# Patient Record
Sex: MALE | Race: WHITE | NOT HISPANIC OR LATINO | URBAN - METROPOLITAN AREA
[De-identification: names, ages, dates, MRNs, and addresses within clinical notes are randomized per-mention and may not be internally consistent; named-entity substitution may affect disease eponyms.]

---

## 2019-03-23 ENCOUNTER — TRANSCRIBE ORDERS (OUTPATIENT)
Dept: ADMINISTRATIVE | Age: 64
End: 2019-03-23

## 2019-03-23 ENCOUNTER — APPOINTMENT (OUTPATIENT)
Dept: RADIOLOGY | Age: 64
End: 2019-03-23
Payer: COMMERCIAL

## 2019-03-23 DIAGNOSIS — M25.562 LEFT KNEE PAIN, UNSPECIFIED CHRONICITY: ICD-10-CM

## 2019-03-23 DIAGNOSIS — M25.562 LEFT KNEE PAIN, UNSPECIFIED CHRONICITY: Primary | ICD-10-CM

## 2019-03-23 PROCEDURE — 73562 X-RAY EXAM OF KNEE 3: CPT

## 2019-09-18 ENCOUNTER — TRANSCRIBE ORDERS (OUTPATIENT)
Dept: ADMINISTRATIVE | Facility: HOSPITAL | Age: 64
End: 2019-09-18

## 2019-09-18 DIAGNOSIS — G89.29 CHRONIC PAIN OF LEFT KNEE: Primary | ICD-10-CM

## 2019-09-18 DIAGNOSIS — M25.562 CHRONIC PAIN OF LEFT KNEE: Primary | ICD-10-CM

## 2019-09-29 ENCOUNTER — HOSPITAL ENCOUNTER (OUTPATIENT)
Dept: RADIOLOGY | Facility: HOSPITAL | Age: 64
Discharge: HOME | End: 2019-09-29
Attending: ANESTHESIOLOGY
Payer: COMMERCIAL

## 2019-09-29 DIAGNOSIS — M25.562 CHRONIC PAIN OF LEFT KNEE: ICD-10-CM

## 2019-09-29 DIAGNOSIS — G89.29 CHRONIC PAIN OF LEFT KNEE: ICD-10-CM

## 2019-09-29 PROCEDURE — 73721 MRI JNT OF LWR EXTRE W/O DYE: CPT

## 2020-01-11 ENCOUNTER — OFFICE VISIT (OUTPATIENT)
Dept: URGENT CARE | Facility: CLINIC | Age: 65
End: 2020-01-11
Payer: COMMERCIAL

## 2020-01-11 VITALS
HEART RATE: 94 BPM | SYSTOLIC BLOOD PRESSURE: 118 MMHG | WEIGHT: 208 LBS | HEIGHT: 71 IN | RESPIRATION RATE: 18 BRPM | DIASTOLIC BLOOD PRESSURE: 80 MMHG | BODY MASS INDEX: 29.12 KG/M2 | OXYGEN SATURATION: 100 % | TEMPERATURE: 98.8 F

## 2020-01-11 DIAGNOSIS — I49.9 IRREGULAR HEART RATE: Primary | ICD-10-CM

## 2020-01-11 PROCEDURE — 93005 ELECTROCARDIOGRAM TRACING: CPT | Performed by: PREVENTIVE MEDICINE

## 2020-01-11 PROCEDURE — 99213 OFFICE O/P EST LOW 20 MIN: CPT | Performed by: PREVENTIVE MEDICINE

## 2020-01-11 NOTE — PATIENT INSTRUCTIONS
You need to see a physician as soon as possible and to evaluate your atrial fibrillation    Ideally see a cardiologist

## 2020-01-12 LAB
ATRIAL RATE: 178 BPM
QRS AXIS: 7 DEGREES
QRSD INTERVAL: 94 MS
QT INTERVAL: 332 MS
QTC INTERVAL: 399 MS
T WAVE AXIS: 5 DEGREES
VENTRICULAR RATE: 87 BPM

## 2020-01-12 PROCEDURE — 93010 ELECTROCARDIOGRAM REPORT: CPT | Performed by: PREVENTIVE MEDICINE

## 2023-04-09 NOTE — PROGRESS NOTES
[facility]        NAME: Job Joy is a 71 y o  male  : 1955    MRN: [id number]  DATE: 2020  TIME: 12:17 PM    Assessment and Plan   Irregular heart rate [I49 9]  1  Irregular heart rate  POCT ECG         Patient Instructions       Follow up with PCP in 3-5 days  Proceed to  ER if symptoms worsen  Chief Complaint     Chief Complaint   Patient presents with    Abnormal ECG     Patient seen as Urgent Care due to irregular HR discovered this am during a DOT Physical performed at this location (Employer unwilling to pay for follow-through EKG, therefore pt made determination to be seen urgently ) Denies any SOB, lightheadedness, or "palpitations " Had experienced "heart fluttering" ~ 2 years ago and f/u'd with PCP - was advised EKG was normal at that time  History of Present Illness       He is here for routine department transfer addition physical   I noticed an irregular heartbeat  Cardiogram reveals atrial fibrillation  Note he said 2 years ago his family doctor noted lot of extra beats to his heart  Review of Systems   Review of Systems   Respiratory: Negative for shortness of breath  Cardiovascular: Positive for palpitations  Negative for chest pain  Current Medications     No current outpatient medications on file  Current Allergies     Allergies as of 2020 - Reviewed 2020   Allergen Reaction Noted    Sulfites  2020            The following portions of the patient's history were reviewed and updated as appropriate: allergies, current medications, past family history, past medical history, past social history, past surgical history and problem list      No past medical history on file  No past surgical history on file  No family history on file  Medications have been verified          Objective   /80   Pulse 94   Temp 98 8 °F (37 1 °C)   Resp 18   Ht 5' 11" (1 803 m)   Wt 94 3 kg (208 lb)   SpO2 100%   BMI 29 01 kg/m²        Physical Exam     Physical Exam   Cardiovascular: Normal heart sounds  Exam reveals no gallop and no friction rub  No murmur heard    Heart rhythm is irregularly irregular     EKG reveals atrial fibrillating no

## 2024-03-04 ENCOUNTER — APPOINTMENT (OUTPATIENT)
Dept: URGENT CARE | Facility: CLINIC | Age: 69
End: 2024-03-04

## 2024-09-03 ENCOUNTER — OFFICE VISIT (OUTPATIENT)
Dept: INTERNAL MEDICINE CLINIC | Facility: CLINIC | Age: 69
End: 2024-09-03

## 2024-09-03 ENCOUNTER — TELEPHONE (OUTPATIENT)
Dept: INTERNAL MEDICINE CLINIC | Facility: CLINIC | Age: 69
End: 2024-09-03

## 2024-09-03 VITALS
HEART RATE: 85 BPM | WEIGHT: 177.13 LBS | SYSTOLIC BLOOD PRESSURE: 132 MMHG | HEIGHT: 70 IN | OXYGEN SATURATION: 99 % | BODY MASS INDEX: 25.36 KG/M2 | DIASTOLIC BLOOD PRESSURE: 81 MMHG | TEMPERATURE: 97.6 F

## 2024-09-03 DIAGNOSIS — Z12.5 SCREENING PSA (PROSTATE SPECIFIC ANTIGEN): ICD-10-CM

## 2024-09-03 DIAGNOSIS — Z12.11 COLON CANCER SCREENING: ICD-10-CM

## 2024-09-03 DIAGNOSIS — Z00.00 WELL ADULT EXAM: Primary | ICD-10-CM

## 2024-09-03 DIAGNOSIS — Z87.19 HISTORY OF PANCREATITIS: ICD-10-CM

## 2024-09-03 DIAGNOSIS — Z72.0 TOBACCO USE: ICD-10-CM

## 2024-09-03 DIAGNOSIS — E78.49 OTHER HYPERLIPIDEMIA: ICD-10-CM

## 2024-09-03 DIAGNOSIS — I48.20 ATRIAL FIBRILLATION, CHRONIC (HCC): ICD-10-CM

## 2024-09-03 DIAGNOSIS — E55.9 VITAMIN D DEFICIENCY: ICD-10-CM

## 2024-09-03 DIAGNOSIS — R73.01 ELEVATED FASTING GLUCOSE: ICD-10-CM

## 2024-09-03 PROCEDURE — 1159F MED LIST DOCD IN RCRD: CPT | Performed by: FAMILY MEDICINE

## 2024-09-03 PROCEDURE — 1160F RVW MEDS BY RX/DR IN RCRD: CPT | Performed by: FAMILY MEDICINE

## 2024-09-03 PROCEDURE — 99203 OFFICE O/P NEW LOW 30 MIN: CPT | Performed by: FAMILY MEDICINE

## 2024-09-03 PROCEDURE — 3725F SCREEN DEPRESSION PERFORMED: CPT | Performed by: FAMILY MEDICINE

## 2024-09-03 PROCEDURE — 1101F PT FALLS ASSESS-DOCD LE1/YR: CPT | Performed by: FAMILY MEDICINE

## 2024-09-03 PROCEDURE — 3288F FALL RISK ASSESSMENT DOCD: CPT | Performed by: FAMILY MEDICINE

## 2024-09-03 PROCEDURE — 99387 INIT PM E/M NEW PAT 65+ YRS: CPT | Performed by: FAMILY MEDICINE

## 2024-09-03 NOTE — ASSESSMENT & PLAN NOTE
Due for fasting labs , and screening PSA , declines pneumovax and Shingrix , agreeable to Cologuard

## 2024-09-03 NOTE — PROGRESS NOTES
Adult Annual Physical  Name: Chucho Mckeon      : 1955      MRN: [id number]  Encounter Provider: Jill Parker MD  Encounter Date: 9/3/2024   Encounter department: [facility]    Assessment & Plan   1. Well adult exam  Assessment & Plan:  Due for fasting labs , and screening PSA , declines pneumovax and Shingrix , agreeable to Cologuard   2. Vitamin D deficiency  3. 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 History of pancreatitis  Assessment & Plan:  Pt had admission earlier this year  severe upper abdominal pain  dx  pancreatitis with hemorrhage necessitating exploratory lap , had IR embolization of pancreatic branch pancreatic artery , he had a lengthy hospital stay and rehabilitation took 2 months to feel better . He embarked upon healthy diet , and resuming regular exercise slowly , he has lost 20 + lbs , feels very well   He is still drinking alcohol 6 pack beer daily , he wants to continue  cutting back and quit he is encouraged to do this   5. Tobacco use  -     CBC and differential; Future  6. Colon cancer screening  -     Cologuard  7. Screening PSA (prostate specific antigen)  -     PSA, Total Screen; Future  8. Elevated fasting glucose  -     Comprehensive metabolic panel; Future  -     Hemoglobin A1C; Future  9. Other hyperlipidemia  -     Lipid panel; Future    Immunizations and preventive care screenings were discussed with patient today. Appropriate education was printed on patient's after visit summary.        Counseling:  Alcohol/drug use: discussed moderation in alcohol intake, the recommendations for healthy alcohol use, and avoidance of illicit drug use.  Dental Health: discussed importance of  regular tooth brushing, flossing, and dental visits.  Exercise: the importance of regular exercise/physical activity was discussed. Recommend exercise 3-5 times per week for at least 30 minutes.     BMI Counseling: Body mass index is 25.41 kg/m². The BMI is above normal. Nutrition recommendations include decreasing portion sizes, encouraging healthy choices of fruits and vegetables, decreasing fast food intake, consuming healthier snacks, limiting drinks that contain sugar and reducing intake of saturated and trans fat. Exercise recommendations include exercising 3-5 times per week. Rationale for BMI follow-up plan is due to patient being overweight or obese.         History of Present Illness     Adult Annual Physical New pt to this practice , prior pt of mine , annual wellness visit   Vision a bit over 1 year   Dental q 6 mo   Hearing normal   Diet good with meat chicken fish , could do better with veggies fruits does have salad 3 x per week water  3 qt per day smoothie and coffee , green tea 16 qt per day , - juice - soda    Exercise universal gym 3 - 5  x per week 30 minutes    Smoker former smoked 30 yrs , 1 ppd quit cold turkey ,   ETOH he has cut back   Fam hx - HTN , - HLD - DM   Fam hx cancer Sister melanoma did age 40   Review of Systems   Constitutional:  Negative for chills and fever.   HENT:  Negative for ear pain and sore throat.    Eyes:  Negative for pain and visual disturbance.   Respiratory:  Negative for cough and shortness of breath.    Cardiovascular:  Negative for chest pain and palpitations.   Gastrointestinal:  Negative for abdominal pain and vomiting.   Genitourinary:  Negative for dysuria and hematuria.   Musculoskeletal:  Negative for arthralgias and back pain.   Skin:  Negative for color change and rash.   Neurological:  Negative for seizures and syncope.   All other systems reviewed and are negative.        Objective     /81 (BP Location: Right arm, Patient Position: Sitting, Cuff  "Size: Standard)   Pulse 85   Temp 97.6 °F (36.4 °C) (Temporal)   Ht 5' 10\" (1.778 m)   Wt 80.3 kg (177 lb 2 oz)   SpO2 99%   BMI 25.41 kg/m²     Physical Exam  Vitals and nursing note reviewed.   Constitutional:       General: He is not in acute distress.     Appearance: He is well-developed.      Comments: Skin with good color turgor , well hydrated ,no distress noted     HENT:      Head: Normocephalic and atraumatic.      Right Ear: No decreased hearing noted. No middle ear effusion. There is no impacted cerumen.      Left Ear: No decreased hearing noted.  No middle ear effusion. There is no impacted cerumen.      Nose: No congestion.      Mouth/Throat:      Pharynx: Oropharynx is clear.   Eyes:      Conjunctiva/sclera: Conjunctivae normal.   Neck:      Thyroid: No thyromegaly.   Cardiovascular:      Rate and Rhythm: Normal rate and regular rhythm.      Heart sounds: Normal heart sounds. No murmur heard.  Pulmonary:      Effort: Pulmonary effort is normal. No respiratory distress.      Breath sounds: Normal breath sounds.   Abdominal:      Palpations: Abdomen is soft.      Tenderness: There is no abdominal tenderness. There is no right CVA tenderness, left CVA tenderness, guarding or rebound.   Musculoskeletal:         General: No swelling.      Cervical back: Neck supple.   Lymphadenopathy:      Cervical: No cervical adenopathy.      Right cervical: No superficial cervical adenopathy.     Left cervical: No superficial cervical adenopathy.   Skin:     General: Skin is warm and dry.      Capillary Refill: Capillary refill takes less than 2 seconds.   Neurological:      Mental Status: He is alert.      Comments: Non focal exam   Psychiatric:         Attention and Perception: Attention normal.         Mood and Affect: Mood normal.         Speech: Speech normal.         Behavior: Behavior normal.         Thought Content: Thought content normal.         "

## 2024-09-03 NOTE — ASSESSMENT & PLAN NOTE
Pt had admission earlier this year  severe upper abdominal pain  dx  pancreatitis with hemorrhage necessitating exploratory lap , had IR embolization of pancreatic branch pancreatic artery , he had a lengthy hospital stay and rehabilitation took 2 months to feel better . He embarked upon healthy diet , and resuming regular exercise slowly , he has lost 20 + lbs , feels very well   He is still drinking alcohol 6 pack beer daily , he wants to continue  cutting back and quit he is encouraged to do this

## 2024-09-03 NOTE — ASSESSMENT & PLAN NOTE
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

## 2024-09-05 ENCOUNTER — TELEPHONE (OUTPATIENT)
Dept: INTERNAL MEDICINE CLINIC | Facility: CLINIC | Age: 69
End: 2024-09-05

## 2024-09-05 NOTE — TELEPHONE ENCOUNTER
Received call from patient. Introduced self and role. Patient updated labs were ordered by physician. Patient does not need paper scripts if [facility]. Patient updated if going outside the network he will need copies of his ordered labs. Patient expressed understanding and stated he will just go to a [facility] facility.     Patient aware physician ordered a lipid panel and to fast 10-12 hrs prior to getting bloodwork completed.

## 2024-09-08 ENCOUNTER — APPOINTMENT (OUTPATIENT)
Dept: LAB | Age: 69
End: 2024-09-08
Payer: COMMERCIAL

## 2024-09-08 DIAGNOSIS — Z72.0 TOBACCO USE: ICD-10-CM

## 2024-09-08 DIAGNOSIS — E78.49 OTHER HYPERLIPIDEMIA: ICD-10-CM

## 2024-09-08 DIAGNOSIS — R73.01 ELEVATED FASTING GLUCOSE: ICD-10-CM

## 2024-09-08 DIAGNOSIS — Z12.5 SCREENING PSA (PROSTATE SPECIFIC ANTIGEN): ICD-10-CM

## 2024-09-08 LAB
ALBUMIN SERPL BCG-MCNC: 4.1 G/DL (ref 3.5–5)
ALP SERPL-CCNC: 78 U/L (ref 34–104)
ALT SERPL W P-5'-P-CCNC: 35 U/L (ref 7–52)
ANION GAP SERPL CALCULATED.3IONS-SCNC: 9 MMOL/L (ref 4–13)
AST SERPL W P-5'-P-CCNC: 36 U/L (ref 13–39)
BASOPHILS # BLD AUTO: 0.03 THOUSANDS/ÂΜL (ref 0–0.1)
BASOPHILS NFR BLD AUTO: 0 % (ref 0–1)
BILIRUB SERPL-MCNC: 1.23 MG/DL (ref 0.2–1)
BUN SERPL-MCNC: 20 MG/DL (ref 5–25)
CALCIUM SERPL-MCNC: 9.3 MG/DL (ref 8.4–10.2)
CHLORIDE SERPL-SCNC: 101 MMOL/L (ref 96–108)
CHOLEST SERPL-MCNC: 171 MG/DL
CO2 SERPL-SCNC: 29 MMOL/L (ref 21–32)
CREAT SERPL-MCNC: 0.67 MG/DL (ref 0.6–1.3)
EOSINOPHIL # BLD AUTO: 0.14 THOUSAND/ÂΜL (ref 0–0.61)
EOSINOPHIL NFR BLD AUTO: 2 % (ref 0–6)
ERYTHROCYTE [DISTWIDTH] IN BLOOD BY AUTOMATED COUNT: 13.8 % (ref 11.6–15.1)
EST. AVERAGE GLUCOSE BLD GHB EST-MCNC: 108 MG/DL
GFR SERPL CREATININE-BSD FRML MDRD: 97 ML/MIN/1.73SQ M
GLUCOSE P FAST SERPL-MCNC: 106 MG/DL (ref 65–99)
HBA1C MFR BLD: 5.4 %
HCT VFR BLD AUTO: 41.2 % (ref 36.5–49.3)
HDLC SERPL-MCNC: 88 MG/DL
HGB BLD-MCNC: 13.5 G/DL (ref 12–17)
IMM GRANULOCYTES # BLD AUTO: 0.02 THOUSAND/UL (ref 0–0.2)
IMM GRANULOCYTES NFR BLD AUTO: 0 % (ref 0–2)
LDLC SERPL CALC-MCNC: 66 MG/DL (ref 0–100)
LYMPHOCYTES # BLD AUTO: 1.89 THOUSANDS/ÂΜL (ref 0.6–4.47)
LYMPHOCYTES NFR BLD AUTO: 20 % (ref 14–44)
MCH RBC QN AUTO: 32.6 PG (ref 26.8–34.3)
MCHC RBC AUTO-ENTMCNC: 32.8 G/DL (ref 31.4–37.4)
MCV RBC AUTO: 100 FL (ref 82–98)
MONOCYTES # BLD AUTO: 0.56 THOUSAND/ÂΜL (ref 0.17–1.22)
MONOCYTES NFR BLD AUTO: 6 % (ref 4–12)
NEUTROPHILS # BLD AUTO: 6.92 THOUSANDS/ÂΜL (ref 1.85–7.62)
NEUTS SEG NFR BLD AUTO: 72 % (ref 43–75)
NONHDLC SERPL-MCNC: 83 MG/DL
NRBC BLD AUTO-RTO: 0 /100 WBCS
PLATELET # BLD AUTO: 201 THOUSANDS/UL (ref 149–390)
PMV BLD AUTO: 10.4 FL (ref 8.9–12.7)
POTASSIUM SERPL-SCNC: 5 MMOL/L (ref 3.5–5.3)
PROT SERPL-MCNC: 6 G/DL (ref 6.4–8.4)
PSA SERPL-MCNC: 0.7 NG/ML (ref 0–4)
RBC # BLD AUTO: 4.14 MILLION/UL (ref 3.88–5.62)
SODIUM SERPL-SCNC: 139 MMOL/L (ref 135–147)
TRIGL SERPL-MCNC: 85 MG/DL
WBC # BLD AUTO: 9.56 THOUSAND/UL (ref 4.31–10.16)

## 2024-09-08 PROCEDURE — 85025 COMPLETE CBC W/AUTO DIFF WBC: CPT

## 2024-09-08 PROCEDURE — 83036 HEMOGLOBIN GLYCOSYLATED A1C: CPT

## 2024-09-08 PROCEDURE — G0103 PSA SCREENING: HCPCS

## 2024-09-08 PROCEDURE — 80061 LIPID PANEL: CPT

## 2024-09-08 PROCEDURE — 36415 COLL VENOUS BLD VENIPUNCTURE: CPT

## 2024-09-08 PROCEDURE — 80053 COMPREHEN METABOLIC PANEL: CPT

## 2024-10-03 PROBLEM — Z12.11 COLON CANCER SCREENING: Status: RESOLVED | Noted: 2024-09-03 | Resolved: 2024-10-03

## 2024-10-03 PROBLEM — Z00.00 WELL ADULT EXAM: Status: RESOLVED | Noted: 2024-09-03 | Resolved: 2024-10-03

## 2024-10-03 LAB — COLOGUARD RESULT REPORTABLE: POSITIVE

## 2024-10-14 DIAGNOSIS — R19.5 POSITIVE COLORECTAL CANCER SCREENING USING COLOGUARD TEST: Primary | ICD-10-CM

## 2024-10-15 ENCOUNTER — TELEPHONE (OUTPATIENT)
Age: 69
End: 2024-10-15

## 2024-10-16 ENCOUNTER — OFFICE VISIT (OUTPATIENT)
Dept: GASTROENTEROLOGY | Facility: CLINIC | Age: 69
End: 2024-10-16
Payer: COMMERCIAL

## 2024-10-16 VITALS
HEART RATE: 60 BPM | HEIGHT: 70 IN | SYSTOLIC BLOOD PRESSURE: 140 MMHG | BODY MASS INDEX: 26.2 KG/M2 | WEIGHT: 183 LBS | DIASTOLIC BLOOD PRESSURE: 80 MMHG

## 2024-10-16 DIAGNOSIS — Z87.19 HISTORY OF PANCREATITIS: ICD-10-CM

## 2024-10-16 DIAGNOSIS — R19.5 POSITIVE COLORECTAL CANCER SCREENING USING COLOGUARD TEST: Primary | ICD-10-CM

## 2024-10-16 DIAGNOSIS — I48.20 ATRIAL FIBRILLATION, CHRONIC (HCC): ICD-10-CM

## 2024-10-16 PROCEDURE — 99203 OFFICE O/P NEW LOW 30 MIN: CPT | Performed by: PHYSICIAN ASSISTANT

## 2024-10-16 RX ORDER — RIVAROXABAN 20 MG/1
TABLET, FILM COATED ORAL
COMMUNITY
Start: 2024-08-17

## 2024-10-16 RX ORDER — RIBOSE 100 %
POWDER (GRAM) MISCELLANEOUS
COMMUNITY

## 2024-10-16 RX ORDER — MULTIVITAMIN
1 CAPSULE ORAL DAILY
COMMUNITY

## 2024-10-16 RX ORDER — CRANBERRY FRUIT EXTRACT 650 MG
CAPSULE ORAL
COMMUNITY

## 2024-10-16 RX ORDER — MULTIVIT WITH MINERALS/LUTEIN
1000 TABLET ORAL DAILY
COMMUNITY

## 2024-10-16 RX ORDER — METOPROLOL SUCCINATE 100 MG/1
100 TABLET, EXTENDED RELEASE ORAL DAILY
COMMUNITY
Start: 2024-05-14

## 2024-10-16 NOTE — PATIENT INSTRUCTIONS
Scheduled date of colonoscopy (as of today): 11/27/24  Physician performing colonoscopy: Dr. Lopez  Location of colonoscopy: [place]  Bowel prep reviewed with patient: Golytely  Instructions reviewed with patient by: Joan  Clearances: Ct Li

## 2024-10-16 NOTE — ASSESSMENT & PLAN NOTE
Patient admitted to [facility] early this year with hemorrhagic pancreatitis requiring exploratory lap, IR embolization, prolonged hospitalization and rehabilitation. Patient states etiology of pancreatitis was unclear.  He has history of heavy drinking although had been sober for 1 year prior to hospitalization. He has since picked up drinking again. I advised complete alcohol cessation since heavy alcohol use can precipitate another episode of pancreatitis.

## 2024-10-16 NOTE — ASSESSMENT & PLAN NOTE
We will request to hold Xarelto for 2 days prior to procedure to reduce risk of bleeding. I explained there is small risk of stroke with being off Xarelto however we will resume Xarelto as soon as possible after colonoscopy.

## 2024-10-16 NOTE — PROGRESS NOTES
Ambulatory Visit  Name: Chucho Mckeon      : 1955      MRN: [id number]  Encounter Provider: Candice Hernández PA-C  Encounter Date: 10/16/2024   Encounter department: [facility] [place]    Assessment & Plan  Positive colorectal cancer screening using Cologuard test  Recent positive Cologuard, last colonoscopy 10 years ago.  We will schedule colonoscopy to evaluate for precancerous polyps and colon cancer.    I discussed informed consent with the patient. The risks/benefits/alternatives of the procedure were discussed with the patient. Risks included, but not limited to, infection, bleeding, perforation, injury to organs in the abdomen, missed lesion and incomplete procedure were discussed. Patient was agreeable. Gave instructions for GoLytely prep.        Orders:    Ambulatory Referral to Gastroenterology    Colonoscopy; Future    polyethylene glycol (GOLYTELY) 4000 mL solution; Take as directed by the office for colonoscopy.    Atrial fibrillation, chronic (HCC)  We will request to hold Xarelto for 2 days prior to procedure to reduce risk of bleeding. I explained there is small risk of stroke with being off Xarelto however we will resume Xarelto as soon as possible after colonoscopy.       History of pancreatitis  Patient admitted to [facility] early this year with hemorrhagic pancreatitis requiring exploratory lap, IR embolization, prolonged hospitalization and rehabilitation. Patient states etiology of pancreatitis was unclear.  He has history of heavy drinking although had been sober for 1 year prior to hospitalization. He has since picked up drinking again. I advised complete alcohol cessation since heavy alcohol use can precipitate another episode of pancreatitis.             History of Present Illness     Chucho Mckeon is a 69 y.o. male with history of pancreatitis, chronic atrial fibrillation, hyperlipidemia, impaired fasting glucose, vitamin D deficiency, tobacco use  referred by PCP for positive Cologuard.    Patient states he had colonoscopy 10 years ago.  He had negative Cologuard about 5 years ago.  Now most recent Cologuard is positive.  He denies rectal bleeding.  He has regular bowel movements.  He denies change in bowel movements, abdominal pain, nausea, vomiting, reflux, trouble swallowing.    No family history of colon cancer.  His mother and father  young.  His sister  of melanoma.    He had prolonged hospitalization at [facility] earlier this year related to hemorrhagic pancreatitis requiring ex lap and embolization.  He states cause of pancreatitis is unclear.  He does have history of heavy drinking however had been sober for 1 year prior to hospitalization.  He has picked up drinking again since hospitalization.        History obtained from : patient        Objective     There were no vitals taken for this visit.    Physical Exam  Vitals and nursing note reviewed.   Constitutional:       General: He is not in acute distress.     Appearance: He is well-developed.   HENT:      Head: Normocephalic and atraumatic.   Eyes:      Conjunctiva/sclera: Conjunctivae normal.   Cardiovascular:      Rate and Rhythm: Normal rate and regular rhythm.      Heart sounds: No murmur heard.  Pulmonary:      Effort: Pulmonary effort is normal. No respiratory distress.      Breath sounds: Normal breath sounds.   Abdominal:      Palpations: Abdomen is soft.      Tenderness: There is no abdominal tenderness.   Musculoskeletal:         General: No swelling.      Cervical back: Neck supple.   Skin:     General: Skin is warm and dry.      Capillary Refill: Capillary refill takes less than 2 seconds.   Neurological:      Mental Status: He is alert.   Psychiatric:         Mood and Affect: Mood normal.       Administrative Statements   I have spent a total time of 15 minutes in caring for this patient on the day of the visit/encounter including Diagnostic results, Risks and benefits of tx  options, Instructions for management, Patient and family education, Reviewing / ordering tests, medicine, procedures  , and Obtaining or reviewing history  .

## 2024-10-16 NOTE — ASSESSMENT & PLAN NOTE
Recent positive Cologuard, last colonoscopy 10 years ago.  We will schedule colonoscopy to evaluate for precancerous polyps and colon cancer.    I discussed informed consent with the patient. The risks/benefits/alternatives of the procedure were discussed with the patient. Risks included, but not limited to, infection, bleeding, perforation, injury to organs in the abdomen, missed lesion and incomplete procedure were discussed. Patient was agreeable. Gave instructions for GoLytely prep.        Orders:    Ambulatory Referral to Gastroenterology    Colonoscopy; Future    polyethylene glycol (GOLYTELY) 4000 mL solution; Take as directed by the office for colonoscopy.

## 2024-11-13 ENCOUNTER — ANESTHESIA EVENT (OUTPATIENT)
Dept: ANESTHESIOLOGY | Facility: HOSPITAL | Age: 69
End: 2024-11-13

## 2024-11-13 ENCOUNTER — ANESTHESIA (OUTPATIENT)
Dept: ANESTHESIOLOGY | Facility: HOSPITAL | Age: 69
End: 2024-11-13

## 2024-11-14 ENCOUNTER — PATIENT MESSAGE (OUTPATIENT)
Dept: RHEUMATOLOGY | Facility: CLINIC | Age: 69
End: 2024-11-14

## 2024-11-27 ENCOUNTER — HOSPITAL ENCOUNTER (OUTPATIENT)
Dept: GASTROENTEROLOGY | Facility: MEDICAL CENTER | Age: 69
Setting detail: OUTPATIENT SURGERY
Discharge: HOME | End: 2024-11-27
Payer: COMMERCIAL

## 2024-11-27 ENCOUNTER — ANESTHESIA EVENT (OUTPATIENT)
Dept: GASTROENTEROLOGY | Facility: MEDICAL CENTER | Age: 69
End: 2024-11-27
Payer: COMMERCIAL

## 2024-11-27 ENCOUNTER — ANESTHESIA (OUTPATIENT)
Dept: GASTROENTEROLOGY | Facility: MEDICAL CENTER | Age: 69
End: 2024-11-27
Payer: COMMERCIAL

## 2024-11-27 VITALS
HEART RATE: 67 BPM | RESPIRATION RATE: 18 BRPM | DIASTOLIC BLOOD PRESSURE: 73 MMHG | WEIGHT: 183 LBS | BODY MASS INDEX: 26.2 KG/M2 | OXYGEN SATURATION: 100 % | TEMPERATURE: 98 F | HEIGHT: 70 IN | SYSTOLIC BLOOD PRESSURE: 140 MMHG

## 2024-11-27 DIAGNOSIS — Z86.0100 HISTORY OF COLON POLYPS: Primary | ICD-10-CM

## 2024-11-27 DIAGNOSIS — R19.5 POSITIVE COLORECTAL CANCER SCREENING USING COLOGUARD TEST: ICD-10-CM

## 2024-11-27 PROCEDURE — 88305 TISSUE EXAM BY PATHOLOGIST: CPT | Performed by: STUDENT IN AN ORGANIZED HEALTH CARE EDUCATION/TRAINING PROGRAM

## 2024-11-27 PROCEDURE — 45385 COLONOSCOPY W/LESION REMOVAL: CPT | Performed by: INTERNAL MEDICINE

## 2024-11-27 RX ORDER — ONDANSETRON 2 MG/ML
4 INJECTION INTRAMUSCULAR; INTRAVENOUS ONCE AS NEEDED
Status: DISCONTINUED | OUTPATIENT
Start: 2024-11-27 | End: 2024-12-01 | Stop reason: HOSPADM

## 2024-11-27 RX ORDER — BISACODYL 5 MG/1
10 TABLET, DELAYED RELEASE ORAL ONCE
Qty: 2 TABLET | Refills: 0 | Status: SHIPPED | OUTPATIENT
Start: 2024-11-27 | End: 2024-11-27

## 2024-11-27 RX ORDER — SODIUM CHLORIDE, SODIUM LACTATE, POTASSIUM CHLORIDE, CALCIUM CHLORIDE 600; 310; 30; 20 MG/100ML; MG/100ML; MG/100ML; MG/100ML
INJECTION, SOLUTION INTRAVENOUS CONTINUOUS PRN
Status: DISCONTINUED | OUTPATIENT
Start: 2024-11-27 | End: 2024-11-27

## 2024-11-27 RX ORDER — PROPOFOL 10 MG/ML
INJECTION, EMULSION INTRAVENOUS AS NEEDED
Status: DISCONTINUED | OUTPATIENT
Start: 2024-11-27 | End: 2024-11-27

## 2024-11-27 RX ADMIN — PROPOFOL 40 MG: 10 INJECTION, EMULSION INTRAVENOUS at 09:38

## 2024-11-27 RX ADMIN — PROPOFOL 30 MG: 10 INJECTION, EMULSION INTRAVENOUS at 09:54

## 2024-11-27 RX ADMIN — SODIUM CHLORIDE, SODIUM LACTATE, POTASSIUM CHLORIDE, AND CALCIUM CHLORIDE: .6; .31; .03; .02 INJECTION, SOLUTION INTRAVENOUS at 09:24

## 2024-11-27 RX ADMIN — PROPOFOL 30 MG: 10 INJECTION, EMULSION INTRAVENOUS at 09:45

## 2024-11-27 RX ADMIN — Medication 40 MG: at 09:33

## 2024-11-27 RX ADMIN — PROPOFOL 60 MG: 10 INJECTION, EMULSION INTRAVENOUS at 09:32

## 2024-11-27 RX ADMIN — PROPOFOL 50 MG: 10 INJECTION, EMULSION INTRAVENOUS at 09:36

## 2024-11-27 RX ADMIN — PROPOFOL 20 MG: 10 INJECTION, EMULSION INTRAVENOUS at 09:42

## 2024-11-27 RX ADMIN — PROPOFOL 50 MG: 10 INJECTION, EMULSION INTRAVENOUS at 09:49

## 2024-11-27 RX ADMIN — PROPOFOL 130 MG: 10 INJECTION, EMULSION INTRAVENOUS at 09:27

## 2024-11-27 NOTE — H&P
History and Physical - [facility]  Chucho Mckeon 69 y.o. male MRN: [id number]    HPI: Chucho Mckeon is a 69 y.o. year old male who presents for colonoscopy for CRC screening and positive cologuard 09/28/24.    Cologuard neg 07/18/21  Last colon ~10y ago    REVIEW OF SYSTEMS: Per the HPI, and otherwise unremarkable.    HISTORICAL INFO  Past Medical History:   Diagnosis Date    Arthritis      Past Surgical History:   Procedure Laterality Date    ABDOMINAL AORTIC ANEURYSM REPAIR       Social History     Tobacco Use    Smoking status: Former     Types: Cigarettes    Smokeless tobacco: Never   Vaping Use    Vaping status: Never Used   Substance Use Topics    Alcohol use: Yes     Alcohol/week: 14.0 standard drinks of alcohol     Types: 14 Shots of liquor per week    Drug use: Never       Current Outpatient Medications:     Amino Acids (AMINO ACID PO)    Ascorbic Acid (vitamin C) 1000 MG tablet    Cholecalciferol (VITAMIN D3) 1,000 units tablet    D-Ribose (Ribose, D,) POWD    DHEA 25 MG CAPS    metoprolol succinate (TOPROL-XL) 100 mg 24 hr tablet    Multiple Vitamin (multivitamin) capsule    Oral Electrolytes (ELECTROLYTE SR PO)    polyethylene glycol (GOLYTELY) 4000 mL solution    Vit D-Ca Beta Hydrox Beta Meth (HMB PRO PO)    Xarelto 20 MG tablet    Allergies   Allergen Reactions    Sulfites - Food Allergy        OBJECTIVE  There were no vitals taken for this visit.    PHYSICAL EXAM  Gen: NAD  Head: NCAT  CV: RRR  CHEST: CTAB  ABD: soft, NT/ND  EXT: no edema    ASSESSMENT/PLAN:   This is a 69 y.o. year old male here for colonoscopy, and he is stable and optimized for his procedure.

## 2024-11-27 NOTE — DISCHARGE INSTRUCTIONS
Resume Phillipto TODAY 11/27/24  
The Delivery OB Provider certifies that vaginal examination and/or abdominal examination after the delivery was done and no foreign body was found.

## 2024-11-27 NOTE — ANESTHESIA PREPROCEDURE EVALUATION
Procedure:  COLONOSCOPY    Relevant Problems   CARDIO   (+) Atrial fibrillation, chronic (HCC)       Left Ventricle: Left ventricle is normal in size. Systolic function is   normal with an ejection fraction of 60%.     Right Ventricle: Right ventricle cavity is normal. Systolic function is   normal.    Aortic Valve: The aortic valve is trileaflet. There is sclerosis.     Physical Exam    Airway    Mallampati score: III  TM Distance: <3 FB  Neck ROM: full     Dental    implants    Cardiovascular  Rhythm: irregular, Rate: normal, No weak pulses    Pulmonary   No stridor    Other Findings        Anesthesia Plan  ASA Score- 3     Anesthesia Type- IV sedation with anesthesia with ASA Monitors.         Additional Monitors:     Airway Plan:            Plan Factors-    Chart reviewed. EKG reviewed.  Existing labs reviewed. Patient summary reviewed.                  Induction- intravenous.    Postoperative Plan-         Informed Consent- Anesthetic plan and risks discussed with patient.  I personally reviewed this patient with the CRNA. Discussed and agreed on the Anesthesia Plan with the CRNA..

## 2024-11-27 NOTE — ANESTHESIA POSTPROCEDURE EVALUATION
Post-Op Assessment Note    CV Status:  Stable    Pain management: adequate       Mental Status:  Alert and awake   Hydration Status:  Euvolemic   PONV Controlled:  Controlled   Airway Patency:  Patent     Post Op Vitals Reviewed: Yes    No anethesia notable event occurred.    Staff: CRNA           Last Filed PACU Vitals:  Vitals Value Taken Time   Temp     Pulse     BP     Resp     SpO2         Modified Maria G:  Activity: 2 (11/27/2024  9:03 AM)  Respiration: 2 (11/27/2024  9:03 AM)  Circulation: 2 (11/27/2024  9:03 AM)  Consciousness: 2 (11/27/2024  9:03 AM)  Oxygen Saturation: 2 (11/27/2024  9:03 AM)  Modified Maria G Score: 10 (11/27/2024  9:03 AM)

## 2024-12-02 ENCOUNTER — RESULTS FOLLOW-UP (OUTPATIENT)
Dept: GASTROENTEROLOGY | Facility: CLINIC | Age: 69
End: 2024-12-02

## 2024-12-02 PROCEDURE — 88305 TISSUE EXAM BY PATHOLOGIST: CPT | Performed by: STUDENT IN AN ORGANIZED HEALTH CARE EDUCATION/TRAINING PROGRAM

## 2024-12-04 ENCOUNTER — TELEPHONE (OUTPATIENT)
Age: 69
End: 2024-12-04

## 2024-12-04 DIAGNOSIS — Z86.0100 HISTORY OF COLON POLYPS: Primary | ICD-10-CM

## 2024-12-05 DIAGNOSIS — Z86.0100 HISTORY OF COLON POLYPS: Primary | ICD-10-CM

## 2024-12-05 RX ORDER — BISACODYL 5 MG/1
TABLET, DELAYED RELEASE ORAL
Qty: 4 TABLET | Refills: 0 | Status: SHIPPED | OUTPATIENT
Start: 2024-12-05

## 2024-12-05 NOTE — TELEPHONE ENCOUNTER
Scheduled date of colonoscopy emr (as of today): 1/16/25  Physician performing colonoscopy: Dr. Bills   Location of colonoscopy: [place]   Bowel prep reviewed with patient: doris/dulcolax   Instructions reviewed with patient by: Livia prasad   Clearances:  Xarelto - Dr. Li

## 2024-12-10 ENCOUNTER — TELEPHONE (OUTPATIENT)
Dept: URGENT CARE | Age: 69
End: 2024-12-10

## 2024-12-10 ENCOUNTER — APPOINTMENT (OUTPATIENT)
Dept: RADIOLOGY | Age: 69
End: 2024-12-10
Payer: COMMERCIAL

## 2024-12-10 ENCOUNTER — OFFICE VISIT (OUTPATIENT)
Dept: URGENT CARE | Age: 69
End: 2024-12-10
Payer: COMMERCIAL

## 2024-12-10 VITALS
SYSTOLIC BLOOD PRESSURE: 148 MMHG | RESPIRATION RATE: 16 BRPM | OXYGEN SATURATION: 99 % | TEMPERATURE: 98.1 F | DIASTOLIC BLOOD PRESSURE: 82 MMHG | HEART RATE: 67 BPM

## 2024-12-10 DIAGNOSIS — M25.562 ACUTE PAIN OF LEFT KNEE: Primary | ICD-10-CM

## 2024-12-10 DIAGNOSIS — M25.562 ACUTE PAIN OF LEFT KNEE: ICD-10-CM

## 2024-12-10 PROCEDURE — 99213 OFFICE O/P EST LOW 20 MIN: CPT

## 2024-12-10 PROCEDURE — 73564 X-RAY EXAM KNEE 4 OR MORE: CPT

## 2024-12-10 NOTE — LETTER
December 10, 2024     Patient: Chucho Mckeon   YOB: 1955   Date of Visit: 12/10/2024       To Whom it May Concern:    Chucho Mckeon was seen in my clinic on 12/10/2024. He may return on 12/13/2024.     If you have any questions or concerns, please don't hesitate to call.         Sincerely,          THOMPSON Schulz        CC: No Recipients

## 2024-12-10 NOTE — PROGRESS NOTES
Assessment/Plan  X-rays reviewed no acute abnormality noted, awaiting official read.   Hinged knee brace applied in office.   Please begin Voltaren gel as directed.   Continue rest, ice, compression and elevation for pain and swelling.   Follow up with PCP or orthopedics if no relief within one week.     Acute pain of left knee [M25.562]  1. Acute pain of left knee  XR knee 4+ vw left injury    Diclofenac Sodium (VOLTAREN) 1 %      Patient Education     Knee Sprain ED   General Information   You came to the Emergency Department (ED) for a knee sprain. This means you bent or twisted your knee too far. Inside your knee, you have tough bands of tissue, called ligaments, that hold the bones together. When you bent or twisted your knee too far, one or more of these ligaments stretched or tore. Now your knee is swollen and sore. You may have pain when you try to walk, put weight on your leg, or bend your knee.  You may be waiting on some test results. The staff will contact you if there are concerning results.  What care is needed at home?   Call your regular doctor to let them know you were in the ED. Make a follow-up appointment if you were told to.  Rest your knee. You can use crutches, if needed, to help keep the weight off of your leg.  Place an ice pack or a bag of frozen peas wrapped in a towel over the painful part. Never put ice right on the skin. Use ice every 1 to 2 hours for 10 to 15 minutes at a time. Use for the first 24 to 48 hours after your injury.  Wrap your knee with an elastic bandage to give your knee some support and help with swelling.  Prop your knee on pillows, keeping it raised above the level of your heart. This may help lessen pain and swelling.  You may want to take drugs like ibuprofen or naproxen for swelling and pain. These are nonsteroidal anti-inflammatory drugs (NSAIDS).  When your swelling and pain are better, slowly start to stretch your knee. Also start doing gentle activities  again.  When do I need to call the doctor?   The pain or swelling gets worse.  Your foot or toes are numb or blue or grey in color.  Your knee is not stable or feels unsteady, like it might give out when you try to walk or put weight on it.  You have new or worsening symptoms.  Last Reviewed Date   2021-01-04  Consumer Information Use and Disclaimer   This generalized information is a limited summary of diagnosis, treatment, and/or medication information. It is not meant to be comprehensive and should be used as a tool to help the user understand and/or assess potential diagnostic and treatment options. It does NOT include all information about conditions, treatments, medications, side effects, or risks that may apply to a specific patient. It is not intended to be medical advice or a substitute for the medical advice, diagnosis, or treatment of a health care provider based on the health care provider's examination and assessment of a patient’s specific and unique circumstances. Patients must speak with a health care provider for complete information about their health, medical questions, and treatment options, including any risks or benefits regarding use of medications. This information does not endorse any treatments or medications as safe, effective, or approved for treating a specific patient. [employer] and its affiliates disclaim any warranty or liability relating to this information or the use thereof. The use of this information is governed by the Terms of Use, available at https://www.[employer]com/en/know/clinical-effectiveness-terms   Copyright   Copyright © 2024 [employer], [employer] and its affiliates and/or licensors. All rights reserved.         Subjective:     Patient ID: Chucho Mckeon is a 69 y.o. male.      Reason For Visit / Chief Complaint  Chief Complaint   Patient presents with    Knee Pain     Patient reports doing yard work on Sunday , denies injury started with left knee pain later that  day. Has a history of left knee patient [facility]. Last injection was 10/23. Now reports swelling, pain with weight bearing, lifting extension and flexion          Knee Pain   The incident occurred 3 to 5 days ago. The incident occurred at home. The injury mechanism was a twisting injury, a direct blow and a fall. The pain is present in the left knee. The pain is moderate. The pain has been Intermittent since onset. Associated symptoms include an inability to bear weight and a loss of motion. Pertinent negatives include no loss of sensation, muscle weakness, numbness or tingling. He reports no foreign bodies present. The symptoms are aggravated by weight bearing and movement. He has tried nothing for the symptoms. The treatment provided no relief.         Past Medical History:   Diagnosis Date    Arthritis     Irregular heart beat     a fib    Pancreatitis        Past Surgical History:   Procedure Laterality Date    ABDOMINAL AORTIC ANEURYSM REPAIR      COLONOSCOPY      SKIN BIOPSY      fatty tumor removed from neck       Family History   Problem Relation Age of Onset    Febrile seizures Mother     Heart attack Father        Review of Systems   Constitutional:  Negative for fatigue and fever.   HENT:  Negative for congestion, ear discharge, ear pain, postnasal drip, rhinorrhea, sinus pressure, sinus pain, sneezing and sore throat.    Eyes: Negative.  Negative for pain, discharge, redness and itching.   Respiratory: Negative.  Negative for apnea, cough, choking, chest tightness, shortness of breath, wheezing and stridor.    Cardiovascular: Negative.  Negative for chest pain and palpitations.   Gastrointestinal: Negative.  Negative for diarrhea, nausea and vomiting.   Endocrine: Negative.  Negative for polydipsia, polyphagia and polyuria.   Genitourinary: Negative.  Negative for decreased urine volume and flank pain.   Musculoskeletal:  Positive for arthralgias and joint swelling.  Negative for back pain, gait problem, myalgias, neck pain and neck stiffness.   Skin: Negative.  Negative for color change and rash.   Allergic/Immunologic: Negative.  Negative for environmental allergies.   Neurological: Negative.  Negative for dizziness, tingling, facial asymmetry, light-headedness, numbness and headaches.   Hematological: Negative.  Negative for adenopathy.   Psychiatric/Behavioral: Negative.         Objective:    /82   Pulse 67   Temp 98.1 °F (36.7 °C) (Tympanic)   Resp 16   SpO2 99%     Physical Exam  Vitals reviewed.   Constitutional:       General: He is not in acute distress.     Appearance: Normal appearance. He is not ill-appearing, toxic-appearing or diaphoretic.      Interventions: He is not intubated.  HENT:      Head: Normocephalic and atraumatic.      Right Ear: Tympanic membrane, ear canal and external ear normal. There is no impacted cerumen.      Left Ear: Tympanic membrane, ear canal and external ear normal. There is no impacted cerumen.      Nose: Nose normal. No congestion or rhinorrhea.      Mouth/Throat:      Mouth: Mucous membranes are moist.      Pharynx: Oropharynx is clear. Uvula midline. No pharyngeal swelling, oropharyngeal exudate, posterior oropharyngeal erythema or uvula swelling.      Tonsils: No tonsillar exudate or tonsillar abscesses. 1+ on the right. 1+ on the left.   Eyes:      Extraocular Movements: Extraocular movements intact.      Conjunctiva/sclera: Conjunctivae normal.      Pupils: Pupils are equal, round, and reactive to light.   Cardiovascular:      Rate and Rhythm: Normal rate and regular rhythm.      Pulses: Normal pulses.      Heart sounds: Normal heart sounds, S1 normal and S2 normal. Heart sounds not distant. No murmur heard.     No friction rub. No gallop.   Pulmonary:      Effort: Pulmonary effort is normal. No tachypnea, bradypnea, accessory muscle usage, prolonged expiration, respiratory distress or retractions. He is not intubated.       Breath sounds: Normal breath sounds. No stridor, decreased air movement or transmitted upper airway sounds. No decreased breath sounds, wheezing, rhonchi or rales.   Chest:      Chest wall: No tenderness.   Musculoskeletal:      Cervical back: Normal range of motion and neck supple. No rigidity or tenderness.      Left knee: Swelling present. No deformity, effusion, erythema, ecchymosis, lacerations or crepitus. Decreased range of motion. Tenderness present over the medial joint line. Normal alignment, normal meniscus and normal patellar mobility.        Legs:    Lymphadenopathy:      Cervical: No cervical adenopathy.   Skin:     General: Skin is warm and dry.      Capillary Refill: Capillary refill takes less than 2 seconds.      Findings: No erythema.   Neurological:      General: No focal deficit present.      Mental Status: He is alert.   Psychiatric:         Mood and Affect: Mood normal.

## 2024-12-11 ENCOUNTER — OFFICE VISIT (OUTPATIENT)
Dept: INTERNAL MEDICINE CLINIC | Facility: CLINIC | Age: 69
End: 2024-12-11

## 2024-12-11 VITALS
TEMPERATURE: 98.6 F | OXYGEN SATURATION: 98 % | HEART RATE: 84 BPM | DIASTOLIC BLOOD PRESSURE: 87 MMHG | WEIGHT: 183 LBS | SYSTOLIC BLOOD PRESSURE: 142 MMHG | BODY MASS INDEX: 26.26 KG/M2

## 2024-12-11 DIAGNOSIS — Z87.828 HISTORY OF MENISCAL TEAR: ICD-10-CM

## 2024-12-11 DIAGNOSIS — S89.92XD KNEE INJURY, LEFT, SUBSEQUENT ENCOUNTER: Primary | ICD-10-CM

## 2024-12-11 DIAGNOSIS — M17.12 ARTHRITIS OF LEFT KNEE: ICD-10-CM

## 2024-12-11 PROCEDURE — 99214 OFFICE O/P EST MOD 30 MIN: CPT | Performed by: FAMILY MEDICINE

## 2024-12-11 NOTE — ASSESSMENT & PLAN NOTE
Pt has had tricompartmental arthritis dating back a number of years , he had a CSI last Summer 2023 which really helped , Dr Byrd stated he may want to begin Euflexa injections at some point

## 2024-12-11 NOTE — ASSESSMENT & PLAN NOTE
See detailed HPI , pt has hx L complex meniscal tear sustained 9/2019 , pt had seen orthopedist Dr NESTOR Ann and he gave pt an injection within a few days the pain was so much better , he did have to favor that knee since that time as it could feel unstable   He was outside in the yard raking leaves 12/8/24 as the [facility] trucks were out unexpectedly on Sunday , he was in a hurry and L foot caught uneven ground area and he fell onto L knee , twisting motion on the way down , he didn't have pain or swelling until the next morning . He had trouble bearing weight , and knee unstable , he didn't ice it , no brace applied no med taken , he went to Urgent Care 12/10 had xray no acute fx or dislocation he was told to get and MRI , [place] , in office he has knee brace on , he stated he left it on during sleep and knee felt worse this morning , + swelling + decr rom , favoring as he walks as knee is weak , urgent referral to ortho [facility] Dr NESTOR Ann placed , await orthopedist  recommendation , he will need FMLA paperwork completed he is a    Orders:    Ambulatory Referral to Orthopedic Surgery; Future

## 2024-12-11 NOTE — PROGRESS NOTES
Name: Chucho Mckeon      : 1955      MRN: [id number]  Encounter Provider: Jill Parker MD  Encounter Date: 2024   Encounter department: [facility]    Assessment & Plan  History of meniscal tear         Knee injury, left, subsequent encounter  See detailed HPI , pt has hx L complex meniscal tear sustained 2019 , pt had seen orthopedist Dr NESTOR Ann and he gave pt an injection within a few days the pain was so much better , he did have to favor that knee since that time as it could feel unstable   He was outside in the yard raking leaves 24 as the [facility] trucks were out unexpectedly on  , he was in a hurry and L foot caught uneven ground area and he fell onto L knee , twisting motion on the way down , he didn't have pain or swelling until the next morning . He had trouble bearing weight , and knee unstable , he didn't ice it , no brace applied no med taken , he went to Urgent Care 12/10 had xray no acute fx or dislocation he was told to get and MRI , [place] , in office he has knee brace on , he stated he left it on during sleep and knee felt worse this morning , + swelling + decr rom , favoring as he walks as knee is weak , urgent referral to ortho [facility] Dr NESTOR Ann placed , await orthopedist  recommendation , he will need FMLA paperwork completed he is a    Orders:    Ambulatory Referral to Orthopedic Surgery; Future    Arthritis of left knee  Pt has had tricompartmental arthritis dating back a number of years , he had a CSI last Summer 2023 which really helped , Dr Byrd stated he may want to begin Euflexa injections at some point        BMI Counseling: Body mass index is 26.26 kg/m². The BMI is above normal. Nutrition recommendations include decreasing portion sizes, encouraging healthy choices of fruits and vegetables, decreasing fast food intake, consuming healthier snacks, limiting drinks that contain sugar and reducing intake of saturated  and trans fat. Exercise recommendations include exercising 3-5 times per week. Rationale for BMI follow-up plan is due to patient being overweight or obese.         History of Present Illness     HPIPt complains of L knee pain , he fell twisted the knee on 12/8/24 , went to Urgent Care yesterday 12/10, had xray no acute findings , he was told he would need follow up  he has existing DJD , hx complex lateral meniscus tear , junction body and posterior horn 9/29/19 he saw Morteza Ann [facility] ortho hda CSI then an he felt better within a few days , last Summer he ahd sonu saldivar more pain in L knee over 6 months and he went back hd injection   He had been having L knee pain , he has always remained with instability L knee since meniscal tear , On Sunday he was working out n the yard he was raking leaves , he mis stepped uneven yard and twisted L knee ,it didn't bother him until Monday , swollen painful , he called off work , he was seen Urgent care yesterday , Xray neg acute , he was given knee stabilizer , he has taken no meds No pain if he doesn't move   Review of Systems   Constitutional:  Negative for chills and fever.   HENT:  Negative for ear pain and sore throat.    Eyes:  Negative for pain and visual disturbance.   Respiratory:  Negative for cough and shortness of breath.    Cardiovascular:  Negative for chest pain and palpitations.   Gastrointestinal:  Negative for abdominal pain and vomiting.   Genitourinary:  Negative for dysuria and hematuria.   Musculoskeletal:  Positive for arthralgias, gait problem and joint swelling. Negative for back pain.   Skin:  Negative for color change and rash.   Neurological:  Negative for seizures and syncope.   Psychiatric/Behavioral:  Positive for sleep disturbance.         Due to L knee injury pain    All other systems reviewed and are negative.    Past Medical History:   Diagnosis Date    Arthritis     Irregular heart beat     a fib    Pancreatitis      Past Surgical  History:   Procedure Laterality Date    ABDOMINAL AORTIC ANEURYSM REPAIR      COLONOSCOPY      SKIN BIOPSY      fatty tumor removed from neck     Family History   Problem Relation Age of Onset    Febrile seizures Mother     Heart attack Father      Social History     Tobacco Use    Smoking status: Former     Types: Cigarettes    Smokeless tobacco: Never   Vaping Use    Vaping status: Never Used   Substance and Sexual Activity    Alcohol use: Not Currently     Alcohol/week: 14.0 standard drinks of alcohol     Types: 14 Shots of liquor per week    Drug use: Never    Sexual activity: Not on file     Current Outpatient Medications on File Prior to Visit   Medication Sig    Amino Acids (AMINO ACID PO) Take by mouth    Ascorbic Acid (vitamin C) 1000 MG tablet Take 1,000 mg by mouth daily    bisacodyl (DULCOLAX) 5 mg EC tablet Take 2 tablets (10 mg total) by mouth once for 1 dose    bisacodyl (DULCOLAX) 5 mg EC tablet Take as directed by GI office    Cholecalciferol (VITAMIN D3) 1,000 units tablet Take 1,000 Units by mouth daily    D-Ribose (Ribose, D,) POWD Use    DHEA 25 MG CAPS Take by mouth    Diclofenac Sodium (VOLTAREN) 1 % Apply 2 g topically 4 (four) times a day    metoprolol succinate (TOPROL-XL) 100 mg 24 hr tablet Take 100 mg by mouth daily    Multiple Vitamin (multivitamin) capsule Take 1 capsule by mouth daily    Oral Electrolytes (ELECTROLYTE SR PO) Take by mouth    polyethylene glycol (GOLYTELY) 4000 mL solution Take 4,000 mL by mouth once for 1 dose    polyethylene glycol (GOLYTELY) 4000 mL solution Take 4,000 mL by mouth once for 1 dose    polyethylene glycol (GOLYTELY) 4000 mL solution Take 4,000 mL by mouth once for 1 dose    Vit D-Ca Beta Hydrox Beta Meth (HMB PRO PO) Take by mouth    Xarelto 20 MG tablet      Allergies   Allergen Reactions    Sulfites - Food Allergy        There is no immunization history on file for this patient.  Objective   /87 (BP Location: Left arm, Patient Position:  Sitting, Cuff Size: Standard)   Pulse 84   Temp 98.6 °F (37 °C)   Wt 83 kg (183 lb)   SpO2 98%   BMI 26.26 kg/m²     Physical Exam  Vitals and nursing note reviewed.   Constitutional:       General: He is not in acute distress.     Appearance: He is well-developed.      Comments: Skin with good color turgor , well hydrated ,no distress noted     HENT:      Head: Normocephalic and atraumatic.      Right Ear: No decreased hearing noted.      Left Ear: No decreased hearing noted.   Eyes:      Conjunctiva/sclera: Conjunctivae normal.   Neck:      Thyroid: No thyromegaly.   Cardiovascular:      Rate and Rhythm: Normal rate. Rhythm irregular.      Heart sounds: Normal heart sounds. No murmur heard.  Pulmonary:      Effort: Pulmonary effort is normal. No respiratory distress.      Breath sounds: Normal breath sounds.   Abdominal:      Palpations: Abdomen is soft.      Tenderness: There is no abdominal tenderness.   Musculoskeletal:         General: No swelling.      Cervical back: Neck supple.      Left knee: Swelling and deformity present. Decreased range of motion.      Comments: Knee brace L    Lymphadenopathy:      Cervical:      Right cervical: No superficial or deep cervical adenopathy.     Left cervical: No superficial or deep cervical adenopathy.   Skin:     General: Skin is warm and dry.      Capillary Refill: Capillary refill takes less than 2 seconds.   Neurological:      Mental Status: He is alert.      Comments: Pt walking favoring L knee    Psychiatric:         Attention and Perception: Attention normal.         Mood and Affect: Mood normal.         Speech: Speech normal.

## 2024-12-13 ENCOUNTER — OFFICE VISIT (OUTPATIENT)
Dept: INTERNAL MEDICINE CLINIC | Facility: CLINIC | Age: 69
End: 2024-12-13

## 2024-12-13 ENCOUNTER — TELEPHONE (OUTPATIENT)
Dept: INTERNAL MEDICINE CLINIC | Facility: CLINIC | Age: 69
End: 2024-12-13

## 2024-12-13 VITALS
TEMPERATURE: 97.9 F | WEIGHT: 181 LBS | BODY MASS INDEX: 25.91 KG/M2 | SYSTOLIC BLOOD PRESSURE: 150 MMHG | DIASTOLIC BLOOD PRESSURE: 85 MMHG | HEIGHT: 70 IN | HEART RATE: 76 BPM

## 2024-12-13 DIAGNOSIS — M17.12 ARTHRITIS OF LEFT KNEE: ICD-10-CM

## 2024-12-13 DIAGNOSIS — Z87.828 HISTORY OF MENISCAL TEAR: ICD-10-CM

## 2024-12-13 DIAGNOSIS — S89.92XD KNEE INJURY, LEFT, SUBSEQUENT ENCOUNTER: Primary | ICD-10-CM

## 2024-12-13 PROCEDURE — 99213 OFFICE O/P EST LOW 20 MIN: CPT | Performed by: FAMILY MEDICINE

## 2024-12-13 NOTE — ASSESSMENT & PLAN NOTE
Patient had L knee CSI by Dr NESTOR Ann on 12/11/24 , he had instant relief of his post injury pain , Dr Ann didn't advise any restrictions of activity . 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

## 2024-12-13 NOTE — PROGRESS NOTES
Name: Chucho Mckeon      : 1955      MRN: [id number]  Encounter Provider: Jill Parker MD  Encounter Date: 2024   [facility]    Assessment & Plan  Knee injury, left, subsequent encounter  Patient had L knee CSI by Dr NESTOR Ann on 24 , he had instant relief of his post injury pain , Dr Ann didn't advise any restrictions of activity . 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 The BMI is above normal. Nutrition recommendations include decreasing portion sizes, encouraging healthy choices of fruits and vegetables, decreasing fast food intake, consuming healthier snacks, limiting drinks that contain sugar and reducing intake of saturated and trans fat. Exercise recommendations include exercising 3-5 times per week. Rationale for BMI follow-up plan is due to patient being overweight or obese.         History of Present Illness     HPI Pt here follow up to visit on , pt had injured L knee in the yard on  , had urgent care visit on 12/10 xray negative , seen here  , + effusion decr rom , hx lateral meniscus tear that knee remote 2019 , referral made to ortho pt seen that day Dr NESTOR Ann , he had injection   He is here to discuss   Patient had instant relief after the CSI , he is back to prior status walking without pain , he has not had to take any Tylenol , doesn't need brace , swelling decreasing , he wants to go back to work   Review of Systems   Constitutional:  Negative for chills and fever.   HENT:  Negative for ear pain and sore throat.    Eyes:  Negative for pain and visual disturbance.    Respiratory:  Negative for cough and shortness of breath.    Cardiovascular:  Negative for chest pain and palpitations.   Gastrointestinal:  Negative for abdominal pain and vomiting.   Genitourinary:  Negative for dysuria and hematuria.   Musculoskeletal:  Positive for arthralgias and joint swelling. Negative for back pain.        L knee injury   Skin:  Negative for color change and rash.   Neurological:  Negative for seizures and syncope.   All other systems reviewed and are negative.    Past Medical History:   Diagnosis Date    Arthritis     Irregular heart beat     a fib    Pancreatitis      Past Surgical History:   Procedure Laterality Date    ABDOMINAL AORTIC ANEURYSM REPAIR      COLONOSCOPY      SKIN BIOPSY      fatty tumor removed from neck     Family History   Problem Relation Age of Onset    Febrile seizures Mother     Heart attack Father      Social History     Tobacco Use    Smoking status: Former     Types: Cigarettes    Smokeless tobacco: Never   Vaping Use    Vaping status: Never Used   Substance and Sexual Activity    Alcohol use: Not Currently     Alcohol/week: 14.0 standard drinks of alcohol     Types: 14 Shots of liquor per week    Drug use: Never    Sexual activity: Not on file     Current Outpatient Medications on File Prior to Visit   Medication Sig    Amino Acids (AMINO ACID PO) Take by mouth    Ascorbic Acid (vitamin C) 1000 MG tablet Take 1,000 mg by mouth daily    bisacodyl (DULCOLAX) 5 mg EC tablet Take 2 tablets (10 mg total) by mouth once for 1 dose    bisacodyl (DULCOLAX) 5 mg EC tablet Take as directed by GI office    Cholecalciferol (VITAMIN D3) 1,000 units tablet Take 1,000 Units by mouth daily    D-Ribose (Ribose, D,) POWD Use    DHEA 25 MG CAPS Take by mouth    Diclofenac Sodium (VOLTAREN) 1 % Apply 2 g topically 4 (four) times a day    metoprolol succinate (TOPROL-XL) 100 mg 24 hr tablet Take 100 mg by mouth daily    Multiple Vitamin (multivitamin) capsule Take 1 capsule by mouth  "daily    Oral Electrolytes (ELECTROLYTE SR PO) Take by mouth    polyethylene glycol (GOLYTELY) 4000 mL solution Take 4,000 mL by mouth once for 1 dose    polyethylene glycol (GOLYTELY) 4000 mL solution Take 4,000 mL by mouth once for 1 dose    polyethylene glycol (GOLYTELY) 4000 mL solution Take 4,000 mL by mouth once for 1 dose    Vit D-Ca Beta Hydrox Beta Meth (HMB PRO PO) Take by mouth    Xarelto 20 MG tablet      Allergies   Allergen Reactions    Sulfites - Food Allergy        There is no immunization history on file for this patient.  Objective   /85 (BP Location: Right arm, Patient Position: Sitting, Cuff Size: Large)   Pulse 76   Temp 97.9 °F (36.6 °C) (Temporal)   Ht 5' 10\" (1.778 m)   Wt 82.1 kg (181 lb)   BMI 25.97 kg/m²     Physical Exam  Vitals and nursing note reviewed.   Constitutional:       General: He is not in acute distress.     Appearance: He is well-developed.      Comments: Skin with good color turgor , well hydrated ,no distress noted     HENT:      Head: Normocephalic and atraumatic.      Right Ear: No decreased hearing noted.      Left Ear: No decreased hearing noted.   Eyes:      Conjunctiva/sclera: Conjunctivae normal.   Neck:      Thyroid: No thyromegaly.   Cardiovascular:      Rate and Rhythm: Normal rate. Rhythm irregular.      Heart sounds: No murmur heard.  Pulmonary:      Effort: Pulmonary effort is normal. No respiratory distress.      Breath sounds: Normal breath sounds.   Abdominal:      Palpations: Abdomen is soft.      Tenderness: There is no abdominal tenderness.   Musculoskeletal:         General: No swelling.      Cervical back: Neck supple.      Left knee: Swelling present. Decreased range of motion.   Skin:     General: Skin is warm and dry.      Capillary Refill: Capillary refill takes less than 2 seconds.   Neurological:      Mental Status: He is alert.      Comments: Non focal exam    Psychiatric:         Attention and Perception: Attention normal.         " Mood and Affect: Mood normal.         Speech: Speech normal.         Behavior: Behavior normal.

## 2024-12-13 NOTE — TELEPHONE ENCOUNTER
Folder Color-Purple    Name of Form-[employer]    Form to be filled out by-Dr Parker    Form to be Faxed given to patient    Patient made aware of 10 business day policy.

## 2025-01-15 ENCOUNTER — TELEPHONE (OUTPATIENT)
Dept: GASTROENTEROLOGY | Facility: HOSPITAL | Age: 70
End: 2025-01-15

## 2025-01-15 RX ORDER — SODIUM CHLORIDE, SODIUM LACTATE, POTASSIUM CHLORIDE, CALCIUM CHLORIDE 600; 310; 30; 20 MG/100ML; MG/100ML; MG/100ML; MG/100ML
125 INJECTION, SOLUTION INTRAVENOUS CONTINUOUS
Status: CANCELLED | OUTPATIENT
Start: 2025-01-15

## 2025-01-15 RX ORDER — LIDOCAINE HYDROCHLORIDE 10 MG/ML
0.5 INJECTION, SOLUTION EPIDURAL; INFILTRATION; INTRACAUDAL; PERINEURAL ONCE AS NEEDED
Status: CANCELLED | OUTPATIENT
Start: 2025-01-15

## 2025-01-16 ENCOUNTER — ANESTHESIA (OUTPATIENT)
Dept: GASTROENTEROLOGY | Facility: HOSPITAL | Age: 70
End: 2025-01-16
Payer: COMMERCIAL

## 2025-01-16 ENCOUNTER — ANESTHESIA EVENT (OUTPATIENT)
Dept: GASTROENTEROLOGY | Facility: HOSPITAL | Age: 70
End: 2025-01-16
Payer: COMMERCIAL

## 2025-01-16 ENCOUNTER — HOSPITAL ENCOUNTER (OUTPATIENT)
Dept: GASTROENTEROLOGY | Facility: HOSPITAL | Age: 70
Setting detail: OUTPATIENT SURGERY
End: 2025-01-16
Attending: INTERNAL MEDICINE
Payer: COMMERCIAL

## 2025-01-16 VITALS
RESPIRATION RATE: 16 BRPM | WEIGHT: 170 LBS | HEART RATE: 82 BPM | SYSTOLIC BLOOD PRESSURE: 145 MMHG | DIASTOLIC BLOOD PRESSURE: 70 MMHG | OXYGEN SATURATION: 100 % | BODY MASS INDEX: 24.34 KG/M2 | TEMPERATURE: 97.2 F | HEIGHT: 70 IN

## 2025-01-16 DIAGNOSIS — Z86.0100 HISTORY OF COLON POLYPS: ICD-10-CM

## 2025-01-16 PROCEDURE — 45390 COLONOSCOPY W/RESECTION: CPT | Performed by: INTERNAL MEDICINE

## 2025-01-16 PROCEDURE — 45385 COLONOSCOPY W/LESION REMOVAL: CPT | Performed by: INTERNAL MEDICINE

## 2025-01-16 PROCEDURE — 88305 TISSUE EXAM BY PATHOLOGIST: CPT | Performed by: PATHOLOGY

## 2025-01-16 RX ORDER — PROPOFOL 10 MG/ML
INJECTION, EMULSION INTRAVENOUS AS NEEDED
Status: DISCONTINUED | OUTPATIENT
Start: 2025-01-16 | End: 2025-01-16

## 2025-01-16 RX ORDER — PROPOFOL 10 MG/ML
INJECTION, EMULSION INTRAVENOUS CONTINUOUS PRN
Status: DISCONTINUED | OUTPATIENT
Start: 2025-01-16 | End: 2025-01-16

## 2025-01-16 RX ORDER — LIDOCAINE HYDROCHLORIDE 10 MG/ML
INJECTION, SOLUTION EPIDURAL; INFILTRATION; INTRACAUDAL; PERINEURAL AS NEEDED
Status: DISCONTINUED | OUTPATIENT
Start: 2025-01-16 | End: 2025-01-16

## 2025-01-16 RX ORDER — SODIUM CHLORIDE 9 MG/ML
INJECTION, SOLUTION INTRAVENOUS CONTINUOUS PRN
Status: DISCONTINUED | OUTPATIENT
Start: 2025-01-16 | End: 2025-01-16

## 2025-01-16 RX ADMIN — PROPOFOL 10 MG: 10 INJECTION, EMULSION INTRAVENOUS at 08:13

## 2025-01-16 RX ADMIN — PROPOFOL 20 MG: 10 INJECTION, EMULSION INTRAVENOUS at 08:09

## 2025-01-16 RX ADMIN — Medication 40 MG: at 08:34

## 2025-01-16 RX ADMIN — PROPOFOL 100 MCG/KG/MIN: 10 INJECTION, EMULSION INTRAVENOUS at 08:08

## 2025-01-16 RX ADMIN — PROPOFOL 20 MG: 10 INJECTION, EMULSION INTRAVENOUS at 08:18

## 2025-01-16 RX ADMIN — SODIUM CHLORIDE: 9 INJECTION, SOLUTION INTRAVENOUS at 08:38

## 2025-01-16 RX ADMIN — SODIUM CHLORIDE: 9 INJECTION, SOLUTION INTRAVENOUS at 08:05

## 2025-01-16 RX ADMIN — LIDOCAINE HYDROCHLORIDE 100 MG: 10 INJECTION, SOLUTION EPIDURAL; INFILTRATION; INTRACAUDAL; PERINEURAL at 08:08

## 2025-01-16 RX ADMIN — PROPOFOL 10 MG: 10 INJECTION, EMULSION INTRAVENOUS at 08:12

## 2025-01-16 RX ADMIN — PROPOFOL 80 MG: 10 INJECTION, EMULSION INTRAVENOUS at 08:08

## 2025-01-16 RX ADMIN — PROPOFOL 10 MG: 10 INJECTION, EMULSION INTRAVENOUS at 08:11

## 2025-01-16 RX ADMIN — PROPOFOL 10 MG: 10 INJECTION, EMULSION INTRAVENOUS at 08:14

## 2025-01-16 NOTE — ANESTHESIA PREPROCEDURE EVALUATION
Procedure:  COLONOSCOPY    EF60%, no sig valvular issues    Afib on metoprolol    Relevant Problems   CARDIO   (+) Atrial fibrillation, chronic (HCC)      MUSCULOSKELETAL   (+) Arthritis of left knee        Physical Exam    Airway    Mallampati score: III  TM Distance: <3 FB  Neck ROM: full     Dental   No notable dental hx     Cardiovascular  Rhythm: regular, Rate: normal, Cardiovascular exam normal    Pulmonary  Pulmonary exam normal Breath sounds clear to auscultation    Other Findings        Anesthesia Plan  ASA Score- 3     Anesthesia Type- IV sedation with anesthesia with ASA Monitors.         Additional Monitors:     Airway Plan:     Comment: Discussed risks/benefits, including medication reactions, awareness, aspiration, and serious/life threatening complications. Plan to maintain native airway with IVGA, monitored with EtCO2.       Plan Factors-Exercise tolerance (METS): >4 METS.    Chart reviewed.    Patient summary reviewed.      Patient instructed to abstain from smoking on day of procedure. Patient did not smoke on day of surgery.            Induction- intravenous.    Postoperative Plan-         Informed Consent- Anesthetic plan and risks discussed with patient.  I personally reviewed this patient with the CRNA. Discussed and agreed on the Anesthesia Plan with the CRNA..      NPO Status:  No vitals data found for the desired time range.

## 2025-01-16 NOTE — ANESTHESIA POSTPROCEDURE EVALUATION
Post-Op Assessment Note    CV Status:  Stable  Pain Score: 0    Pain management: adequate       Mental Status:  Alert and awake   Hydration Status:  Euvolemic   PONV Controlled:  Controlled   Airway Patency:  Patent     Post Op Vitals Reviewed: Yes    No anethesia notable event occurred.    Staff: CRNA           Last Filed PACU Vitals:  Vitals Value Taken Time   Temp 97.2 °F (36.2 °C) 01/16/25 0851   Pulse 79 01/16/25 0851   /58 01/16/25 0851   Resp 16 01/16/25 0851   SpO2 100 % 01/16/25 0851       Modified Maria G:     Vitals Value Taken Time   Activity 2 01/16/25 0851   Respiration 2 01/16/25 0851   Circulation 2 01/16/25 0851   Consciousness 1 01/16/25 0851   Oxygen Saturation 2 01/16/25 0851     Modified Maria G Score: 9

## 2025-01-16 NOTE — H&P
"History and Physical -  [facility]  Chucho Mckeon 69 y.o. male MRN: [id number]    HPI: Chucho Mckeon is a 69 y.o. year old male who presents with colon polyp EMR      Review of Systems    Historical Information   Past Medical History:   Diagnosis Date    Arthritis     Irregular heart beat     a fib    Pancreatitis      Past Surgical History:   Procedure Laterality Date    ABDOMINAL AORTIC ANEURYSM REPAIR      COLONOSCOPY      SKIN BIOPSY      fatty tumor removed from neck     Social History   Social History     Substance and Sexual Activity   Alcohol Use Not Currently    Alcohol/week: 14.0 standard drinks of alcohol    Types: 14 Shots of liquor per week     Social History     Substance and Sexual Activity   Drug Use Never     Social History     Tobacco Use   Smoking Status Former    Types: Cigarettes   Smokeless Tobacco Never     Family History   Problem Relation Age of Onset    Febrile seizures Mother     Heart attack Father        Meds/Allergies     Not in a hospital admission.    Allergies   Allergen Reactions    Sulfites - Food Allergy        Objective     /70   Pulse 87   Temp 98.3 °F (36.8 °C) (Tympanic)   Resp 20   Ht 5' 10\" (1.778 m)   Wt 77.1 kg (170 lb)   SpO2 100%   BMI 24.39 kg/m²       PHYSICAL EXAM    Gen: NAD  CV: RRR  CHEST: Clear  ABD: soft, NT/ND  EXT: no edema  Neuro: AAO      ASSESSMENT/PLAN:  This is a 69 y.o. year old male here for colonoscoyp for colon EMR    PLAN:   Procedure: colonoscopy       "

## 2025-01-17 PROCEDURE — 88305 TISSUE EXAM BY PATHOLOGIST: CPT | Performed by: PATHOLOGY

## 2025-01-20 ENCOUNTER — RESULTS FOLLOW-UP (OUTPATIENT)
Dept: GASTROENTEROLOGY | Facility: MEDICAL CENTER | Age: 70
End: 2025-01-20

## 2025-01-20 NOTE — RESULT ENCOUNTER NOTE
Inform patient via [employer].  Please review the pathology/lab result of further discussion.    Copied from [employer] message :       Aquilinosindy Rankin,     Your polyp was benign but precancerous. I would recommend repeating the endoscopy in one year to follow up.     Best regards,     Salvatore Bills MD

## 2025-06-09 ENCOUNTER — OFFICE VISIT (OUTPATIENT)
Dept: INTERNAL MEDICINE CLINIC | Facility: CLINIC | Age: 70
End: 2025-06-09

## 2025-06-09 VITALS
OXYGEN SATURATION: 98 % | BODY MASS INDEX: 26.83 KG/M2 | TEMPERATURE: 98 F | WEIGHT: 187 LBS | SYSTOLIC BLOOD PRESSURE: 119 MMHG | DIASTOLIC BLOOD PRESSURE: 69 MMHG | HEART RATE: 81 BPM

## 2025-06-09 DIAGNOSIS — I48.20 ATRIAL FIBRILLATION, CHRONIC (HCC): ICD-10-CM

## 2025-06-09 DIAGNOSIS — R89.9 ABNORMAL LABORATORY TEST: Primary | ICD-10-CM

## 2025-06-09 DIAGNOSIS — E78.5 HYPERLIPIDEMIA, UNSPECIFIED HYPERLIPIDEMIA TYPE: ICD-10-CM

## 2025-06-09 PROBLEM — E78.49 OTHER HYPERLIPIDEMIA: Status: ACTIVE | Noted: 2025-06-09

## 2025-06-09 PROCEDURE — 99213 OFFICE O/P EST LOW 20 MIN: CPT | Performed by: FAMILY MEDICINE

## 2025-06-09 NOTE — PROGRESS NOTES
Name: Chucho Mckeon      : 1955      MRN: [id number]  Encounter Provider: Jill Parker MD  Encounter Date: 2025   Encounter department: [facility]    Assessment & Plan  Abnormal laboratory test  Patient had recent lipid profile total chol 204 hdl 70 , ldl 123 , prior panel  , hdl 88 ldl 66 , reviewing panels dating back to  he has never had total chol > 200 ldl never > 76 , he has been sober from ETOH since last panel , but he has had panels drawn in the past in sober state and there was no fluctuation in lipid panel , he has had no other changes in routine , meds or diet , he notes that he had a student draw his blood that day , he is hesitant to start statin as requested by cardiology , will have him get lipid panel updated at a different lab   Orders:    Lipid panel; Future    Hyperlipidemia, unspecified hyperlipidemia type  Patient had recent lipid profile total cholesterol 204 , hdl 70 , ldl 123 , prior panel ,  , hdl 88 ldl 66 , reviewing panels prior dating back to  he has never had total chol > 200 , never had LDL > 76 , he has been sober from ETOH since last blood draw , but he has had panels drawn in the past when sober and LDL never > 76   He notes that   Orders:    Lipid panel; Future    Atrial fibrillation, chronic (HCC)  Patient follows with cardiology , BP , rate controlled on toprol  mg , echo 2024 nl cardiac function ,continue xarelto 20 mg in pm        BMI Counseling: Body mass index is 26.83 kg/m². The BMI is above normal. Nutrition recommendations include decreasing portion sizes, encouraging healthy choices of fruits and vegetables, decreasing fast food intake, consuming healthier snacks, limiting drinks that contain sugar and reducing intake of saturated and trans fat. Exercise recommendations include exercising 3-5 times per week. Rationale for BMI follow-up plan is due to patient being overweight or obese.          History of Present Illness     HPIpatient here to discuss cardiac health , Afib , hyperlipidemia , hx alcohol abuse   Saw cardiology [facility] 5/14/2025   Recent lipid total chol 204 ( 171 ) , hdl 70 (88) , ldl 123 (66)  He has been sober from ETOH no other changes , meds the same reviewing prior labs he has not had LDL this high ever    Review of Systems   Constitutional:  Negative for chills and fever.   HENT:  Negative for ear pain and sore throat.    Eyes:  Negative for pain and visual disturbance.   Respiratory:  Negative for cough and shortness of breath.    Cardiovascular:  Negative for chest pain and palpitations.   Gastrointestinal:  Negative for abdominal pain and vomiting.   Genitourinary:  Negative for dysuria and hematuria.   Musculoskeletal:  Negative for arthralgias and back pain.   Skin:  Negative for color change and rash.   Neurological:  Negative for seizures and syncope.   All other systems reviewed and are negative.    Past Medical History[1]  Past Surgical History[2]  Family History[3]  Social History[4]  Medications[5]  Allergies   Allergen Reactions    Sulfites - Food Allergy      Immunization History   Administered Date(s) Administered    Tdap 01/15/2018     Objective   /69 (BP Location: Left arm, Patient Position: Sitting, Cuff Size: Standard)   Pulse 81   Temp 98 °F (36.7 °C) (Temporal)   Wt 84.8 kg (187 lb)   SpO2 98%   BMI 26.83 kg/m²     Physical Exam  Vitals and nursing note reviewed.   Constitutional:       General: He is not in acute distress.     Appearance: He is well-developed.      Comments: Skin with good color turgor , well hydrated ,no distress noted     HENT:      Head: Normocephalic and atraumatic.      Right Ear: No decreased hearing noted.      Left Ear: No decreased hearing noted.     Eyes:      Conjunctiva/sclera: Conjunctivae normal.     Neck:      Thyroid: No thyromegaly.     Cardiovascular:      Rate and Rhythm: Normal rate and regular rhythm.      Heart  sounds: Normal heart sounds. No murmur heard.  Pulmonary:      Effort: Pulmonary effort is normal. No respiratory distress.      Breath sounds: Normal breath sounds.   Abdominal:      Palpations: Abdomen is soft.      Tenderness: There is no abdominal tenderness.     Musculoskeletal:         General: No swelling.      Cervical back: Neck supple.      Left knee: Decreased range of motion.   Lymphadenopathy:      Cervical:      Right cervical: No superficial cervical adenopathy.     Left cervical: No superficial cervical adenopathy.     Skin:     General: Skin is warm and dry.      Capillary Refill: Capillary refill takes less than 2 seconds.     Neurological:      Mental Status: He is alert.      Comments: Non focal exam    Psychiatric:         Attention and Perception: Attention normal.         Mood and Affect: Mood normal.         Speech: Speech normal.                [1]   Past Medical History:  Diagnosis Date    Arthritis     Irregular heart beat     a fib    Pancreatitis    [2]   Past Surgical History:  Procedure Laterality Date    ABDOMINAL AORTIC ANEURYSM REPAIR      COLONOSCOPY      SKIN BIOPSY      fatty tumor removed from neck   [3]   Family History  Problem Relation Name Age of Onset    Febrile seizures Mother      Heart attack Father     [4]   Social History  Tobacco Use    Smoking status: Former     Types: Cigarettes    Smokeless tobacco: Never   Vaping Use    Vaping status: Never Used   Substance and Sexual Activity    Alcohol use: Not Currently     Alcohol/week: 14.0 standard drinks of alcohol     Types: 14 Shots of liquor per week    Drug use: Never   [5]   Current Outpatient Medications on File Prior to Visit   Medication Sig    Amino Acids (AMINO ACID PO) Take by mouth    Ascorbic Acid (vitamin C) 1000 MG tablet Take 1,000 mg by mouth daily    bisacodyl (DULCOLAX) 5 mg EC tablet Take as directed by GI office    Cholecalciferol (VITAMIN D3) 1,000 units tablet Take 1,000 Units by mouth daily     D-Ribose (Ribose, D,) POWD Use    DHEA 25 MG CAPS Take by mouth    Diclofenac Sodium (VOLTAREN) 1 % Apply 2 g topically 4 (four) times a day    metoprolol succinate (TOPROL-XL) 100 mg 24 hr tablet Take 100 mg by mouth daily    Multiple Vitamin (multivitamin) capsule Take 1 capsule by mouth daily    Oral Electrolytes (ELECTROLYTE SR PO) Take by mouth    Vit D-Ca Beta Hydrox Beta Meth (HMB PRO PO) Take by mouth    Xarelto 20 MG tablet

## 2025-06-09 NOTE — ASSESSMENT & PLAN NOTE
Patient follows with cardiology , BP , rate controlled on toprol  mg , echo 1/2024 nl cardiac function ,continue xarelto 20 mg in pm

## 2025-06-09 NOTE — ASSESSMENT & PLAN NOTE
Patient had recent lipid profile total chol 204 hdl 70 , ldl 123 , prior panel  , hdl 88 ldl 66 , reviewing panels dating back to 2020 he has never had total chol > 200 ldl never > 76 , he has been sober from ETOH since last panel , but he has had panels drawn in the past in sober state and there was no fluctuation in lipid panel , he has had no other changes in routine , meds or diet , he notes that he had a student draw his blood that day , he is hesitant to start statin as requested by cardiology , will have him get lipid panel updated at a different lab   Orders:    Lipid panel; Future

## 2025-06-14 ENCOUNTER — APPOINTMENT (OUTPATIENT)
Dept: LAB | Age: 70
End: 2025-06-14
Attending: FAMILY MEDICINE
Payer: COMMERCIAL

## 2025-06-14 DIAGNOSIS — E78.5 HYPERLIPIDEMIA, UNSPECIFIED HYPERLIPIDEMIA TYPE: ICD-10-CM

## 2025-06-14 DIAGNOSIS — R89.9 ABNORMAL LABORATORY TEST: ICD-10-CM

## 2025-06-14 LAB
CHOLEST SERPL-MCNC: 157 MG/DL (ref ?–200)
HDLC SERPL-MCNC: 68 MG/DL
LDLC SERPL CALC-MCNC: 79 MG/DL (ref 0–100)
NONHDLC SERPL-MCNC: 89 MG/DL
TRIGL SERPL-MCNC: 50 MG/DL (ref ?–150)

## 2025-06-14 PROCEDURE — 36415 COLL VENOUS BLD VENIPUNCTURE: CPT

## 2025-06-14 PROCEDURE — 80061 LIPID PANEL: CPT

## 2025-06-16 ENCOUNTER — RESULTS FOLLOW-UP (OUTPATIENT)
Dept: INTERNAL MEDICINE CLINIC | Facility: CLINIC | Age: 70
End: 2025-06-16